# Patient Record
Sex: MALE | Race: WHITE | NOT HISPANIC OR LATINO | Employment: UNEMPLOYED | URBAN - METROPOLITAN AREA
[De-identification: names, ages, dates, MRNs, and addresses within clinical notes are randomized per-mention and may not be internally consistent; named-entity substitution may affect disease eponyms.]

---

## 2019-12-19 LAB
INFLUENZA A ANTIGEN, POC: NEGATIVE
INFLUENZA B ANTIGEN, POC: NEGATIVE
RAPID GROUP A STREP (OHS): NEGATIVE

## 2021-04-19 LAB
CHOLEST SERPL-MCNC: 142 MG/DL (ref 0–200)
EST. AVERAGE GLUCOSE BLD GHB EST-MCNC: 150 MG/DL (ref 70–115)
HBA1C MFR BLD: 7 % (ref 4–6)
HDLC SERPL-MCNC: 32 MG/DL (ref 40–60)
LDLC SERPL CALC-MCNC: 71.1 MG/DL (ref 30–100)
TRIGL SERPL-MCNC: 179 MG/DL (ref 30–200)

## 2021-07-01 LAB
LEFT EYE DM RETINOPATHY: NEGATIVE
RIGHT EYE DM RETINOPATHY: NEGATIVE

## 2021-07-23 LAB
ALBUMIN SERPL-MCNC: 4.4 G/DL (ref 3.4–5)
ALBUMIN/GLOB SERPL: 1.4 {RATIO}
ALP SERPL-CCNC: 90 U/L (ref 50–144)
ALT SERPL-CCNC: 40 U/L (ref 1–45)
ANION GAP SERPL CALC-SCNC: 7 MMOL/L (ref 7–16)
AST SERPL-CCNC: 28 U/L (ref 17–59)
BILIRUB SERPL-MCNC: 0.46 MG/DL (ref 0.1–1)
BUN SERPL-MCNC: 16 MG/DL (ref 7–20)
CALCIUM SERPL-MCNC: 10 MG/DL (ref 8.4–10.2)
CHLORIDE SERPL-SCNC: 102 MMOL/L (ref 94–110)
CO2 SERPL-SCNC: 29 MMOL/L (ref 21–32)
CREAT SERPL-MCNC: 0.9 MG/DL (ref 0.66–1.25)
CREAT/UREA NIT SERPL: 17.8 (ref 12–20)
EST. AVERAGE GLUCOSE BLD GHB EST-MCNC: 147 MG/DL (ref 70–115)
GLOBULIN SER-MCNC: 3.1 G/DL (ref 2–3.9)
GLUCOSE SERPL-MCNC: 152 MGM./DL (ref 70–115)
HBA1C MFR BLD: 6.9 % (ref 4–6)
POTASSIUM SERPL-SCNC: 5 MMOL/L (ref 3.5–5.1)
PROT SERPL-MCNC: 7.5 G/DL (ref 6.3–8.2)
SODIUM SERPL-SCNC: 138 MMOL/L (ref 135–145)

## 2021-09-19 LAB
ALBUMIN SERPL-MCNC: 5.2 G/DL (ref 3.4–5)
ALBUMIN/GLOB SERPL: 1.3 {RATIO}
ALP SERPL-CCNC: 87 U/L (ref 50–144)
ALT SERPL-CCNC: 76 U/L (ref 1–45)
ANION GAP SERPL CALC-SCNC: 10 MMOL/L (ref 7–16)
AST SERPL-CCNC: 49 U/L (ref 17–59)
BILIRUB SERPL-MCNC: 0.69 MG/DL (ref 0.1–1)
BUN SERPL-MCNC: 18 MG/DL (ref 7–20)
CALCIUM SERPL-MCNC: 10.4 MG/DL (ref 8.4–10.2)
CHLORIDE SERPL-SCNC: 103 MMOL/L (ref 94–110)
CO2 SERPL-SCNC: 25 MMOL/L (ref 21–32)
CREAT SERPL-MCNC: 0.96 MG/DL (ref 0.66–1.25)
CREAT/UREA NIT SERPL: 18.8 (ref 12–20)
GLOBULIN SER-MCNC: 4.1 G/DL (ref 2–3.9)
GLUCOSE SERPL-MCNC: 188 MGM./DL (ref 70–115)
POTASSIUM SERPL-SCNC: 5.1 MMOL/L (ref 3.5–5.1)
PROT SERPL-MCNC: 9.3 G/DL (ref 6.3–8.2)
SODIUM SERPL-SCNC: 138 MMOL/L (ref 135–145)

## 2021-09-22 LAB
ALBUMIN SERPL-MCNC: 5 G/DL (ref 3.4–5)
ALBUMIN/GLOB SERPL: 1.5 {RATIO}
ALP SERPL-CCNC: 79 U/L (ref 50–144)
ALT SERPL-CCNC: 44 U/L (ref 1–45)
ANION GAP SERPL CALC-SCNC: 12 MMOL/L (ref 7–16)
AST SERPL-CCNC: 32 U/L (ref 17–59)
BASOPHILS # BLD AUTO: 0.02 X10(3)/MCL (ref 0.01–0.08)
BASOPHILS NFR BLD AUTO: 0.2 % (ref 0.1–1.2)
BILIRUB SERPL-MCNC: 0.53 MG/DL (ref 0.1–1)
BUN SERPL-MCNC: 13 MG/DL (ref 7–20)
CALCIUM SERPL-MCNC: 9.9 MG/DL (ref 8.4–10.2)
CHLORIDE SERPL-SCNC: 98 MMOL/L (ref 94–110)
CO2 SERPL-SCNC: 30 MMOL/L (ref 21–32)
CREAT SERPL-MCNC: 1 MG/DL (ref 0.66–1.25)
CREAT/UREA NIT SERPL: 13 (ref 12–20)
EOSINOPHIL # BLD AUTO: 0.2 X10(3)/MCL (ref 0.04–0.54)
EOSINOPHIL NFR BLD AUTO: 2.1 % (ref 0.7–7)
ERYTHROCYTE [DISTWIDTH] IN BLOOD BY AUTOMATED COUNT: 11.8 % (ref 11.6–14.4)
GLOBULIN SER-MCNC: 3.3 G/DL (ref 2–3.9)
GLUCOSE SERPL-MCNC: 126 MGM./DL (ref 70–115)
HCT VFR BLD AUTO: 45.5 % (ref 36–52)
HGB BLD-MCNC: 15.4 G/DL (ref 13–18)
IMM GRANULOCYTES # BLD AUTO: 0.04 X10E3/UL (ref 0–0.03)
IMM GRANULOCYTES NFR BLD AUTO: 0.4 % (ref 0–0.5)
LYMPHOCYTES # BLD AUTO: 1.89 X10(3)/MCL (ref 1.32–3.57)
LYMPHOCYTES NFR BLD AUTO: 19.8 % (ref 20–55)
MCH RBC QN AUTO: 30 PG (ref 27–34)
MCHC RBC AUTO-ENTMCNC: 33.8 G/DL (ref 31–37)
MCV RBC AUTO: 88.7 FL (ref 79–99)
MONOCYTES # BLD AUTO: 0.49 X10(3)/MCL (ref 0.3–0.82)
MONOCYTES NFR BLD AUTO: 5.1 % (ref 4.7–12.5)
NEUTROPHILS # BLD AUTO: 6.9 X10(3)/MCL (ref 1.78–5.38)
NEUTROPHILS NFR BLD AUTO: 72.4 % (ref 37–73)
PLATELET # BLD AUTO: 314 X10(3)/MCL (ref 140–371)
PMV BLD AUTO: 9.6 FL (ref 9.4–12.4)
POTASSIUM SERPL-SCNC: 4.6 MMOL/L (ref 3.5–5.1)
PROT SERPL-MCNC: 8.3 G/DL (ref 6.3–8.2)
RBC # BLD AUTO: 5.13 X10(6)/MCL (ref 4–6)
RBC MORPH BLD: NORMAL
SODIUM SERPL-SCNC: 140 MMOL/L (ref 135–145)
WBC # SPEC AUTO: 9.5 X10(3)/MCL (ref 4–11.5)

## 2021-10-18 LAB
ALBUMIN SERPL-MCNC: 4.3 G/DL (ref 3.4–5)
ALBUMIN/GLOB SERPL: 1.4 {RATIO}
ALP SERPL-CCNC: 89 U/L (ref 50–144)
ALT SERPL-CCNC: 35 U/L (ref 1–45)
ANION GAP SERPL CALC-SCNC: 10 MMOL/L (ref 7–16)
AST SERPL-CCNC: 28 U/L (ref 17–59)
BASOPHILS # BLD AUTO: 0.04 X10(3)/MCL (ref 0.01–0.08)
BASOPHILS NFR BLD AUTO: 0.3 % (ref 0.1–1.2)
BILIRUB SERPL-MCNC: 0.5 MG/DL (ref 0.1–1)
BUN SERPL-MCNC: 15 MG/DL (ref 7–20)
CALCIUM SERPL-MCNC: 9.9 MG/DL (ref 8.4–10.2)
CHLORIDE SERPL-SCNC: 97 MMOL/L (ref 94–110)
CHOLEST SERPL-MCNC: 202 MG/DL (ref 0–200)
CO2 SERPL-SCNC: 30 MMOL/L (ref 21–32)
CREAT SERPL-MCNC: 0.98 MG/DL (ref 0.66–1.25)
CREAT/UREA NIT SERPL: 15.3 (ref 12–20)
EOSINOPHIL # BLD AUTO: 0.29 X10(3)/MCL (ref 0.04–0.54)
EOSINOPHIL NFR BLD AUTO: 2.3 % (ref 0.7–7)
ERYTHROCYTE [DISTWIDTH] IN BLOOD BY AUTOMATED COUNT: 12.2 % (ref 11.6–14.4)
EST. AVERAGE GLUCOSE BLD GHB EST-MCNC: 159 MG/DL (ref 70–115)
GLOBULIN SER-MCNC: 3.1 G/DL (ref 2–3.9)
GLUCOSE SERPL-MCNC: 183 MGM./DL (ref 70–115)
HBA1C MFR BLD: 7.3 % (ref 4–6)
HCT VFR BLD AUTO: 46.2 % (ref 36–52)
HDLC SERPL-MCNC: 36 MG/DL (ref 40–60)
HGB BLD-MCNC: 15.3 G/DL (ref 13–18)
IMM GRANULOCYTES # BLD AUTO: 0.08 X10E3/UL (ref 0–0.03)
IMM GRANULOCYTES NFR BLD AUTO: 0.6 % (ref 0–0.5)
LDLC SERPL CALC-MCNC: 96.4 MG/DL (ref 30–100)
LYMPHOCYTES # BLD AUTO: 2.38 X10(3)/MCL (ref 1.32–3.57)
LYMPHOCYTES NFR BLD AUTO: 19 % (ref 20–55)
MCH RBC QN AUTO: 29.7 PG (ref 27–34)
MCHC RBC AUTO-ENTMCNC: 33.1 G/DL (ref 31–37)
MCV RBC AUTO: 89.7 FL (ref 79–99)
MONOCYTES # BLD AUTO: 0.75 X10(3)/MCL (ref 0.3–0.82)
MONOCYTES NFR BLD AUTO: 6 % (ref 4.7–12.5)
NEUTROPHILS # BLD AUTO: 8.97 X10(3)/MCL (ref 1.78–5.38)
NEUTROPHILS NFR BLD AUTO: 71.8 % (ref 37–73)
PLATELET # BLD AUTO: 343 X10(3)/MCL (ref 140–371)
PMV BLD AUTO: 9.1 FL (ref 9.4–12.4)
POTASSIUM SERPL-SCNC: 5 MMOL/L (ref 3.5–5.1)
PROT SERPL-MCNC: 7.4 G/DL (ref 6.3–8.2)
RBC # BLD AUTO: 5.15 X10(6)/MCL (ref 4–6)
SODIUM SERPL-SCNC: 137 MMOL/L (ref 135–145)
T4 FREE SERPL-MCNC: 1.07 NG/DL (ref 0.78–2.19)
TRIGL SERPL-MCNC: 298 MG/DL (ref 30–200)
TSH SERPL-ACNC: 1.79 UIU/ML (ref 0.36–3.74)
WBC # SPEC AUTO: 12.5 X10(3)/MCL (ref 4–11.5)

## 2022-01-19 LAB
ALBUMIN SERPL-MCNC: 4.6 G/DL (ref 3.4–5)
ALBUMIN/GLOB SERPL: 1.6 {RATIO}
ALP SERPL-CCNC: 81 U/L (ref 50–144)
ALT SERPL-CCNC: 37 U/L (ref 1–45)
ANION GAP SERPL CALC-SCNC: 9 MMOL/L (ref 2–13)
AST SERPL-CCNC: 38 U/L (ref 17–59)
BASOPHILS # BLD AUTO: 0.03 10*3/UL (ref 0.01–0.08)
BASOPHILS NFR BLD AUTO: 0.3 % (ref 0.1–1.2)
BILIRUB SERPL-MCNC: 0.6 MG/DL (ref 0–1)
BUN SERPL-MCNC: 16 MG/DL (ref 7–20)
CALCIUM SERPL-MCNC: 9.7 MG/DL (ref 8.4–10.2)
CHLORIDE SERPL-SCNC: 98 MMOL/L (ref 94–110)
CO2 SERPL-SCNC: 28 MMOL/L (ref 21–32)
CREAT SERPL-MCNC: 0.83 MG/DL (ref 0.66–1.25)
CREAT/UREA NIT SERPL: 19.3 (ref 12–20)
EOSINOPHIL # BLD AUTO: 0.17 10*3/UL (ref 0.04–0.54)
EOSINOPHIL NFR BLD AUTO: 1.6 % (ref 0.7–7)
ERYTHROCYTE [DISTWIDTH] IN BLOOD BY AUTOMATED COUNT: 12.1 % (ref 11.6–14.4)
EST. AVERAGE GLUCOSE BLD GHB EST-MCNC: 201 MG/DL (ref 70–115)
GLOBULIN SER-MCNC: 2.9 G/DL (ref 2–3.9)
GLUCOSE SERPL-MCNC: 201 MGM./DL (ref 70–115)
HBA1C MFR BLD: 8.7 % (ref 4–6)
HCT VFR BLD AUTO: 45.1 % (ref 36–52)
HGB BLD-MCNC: 15.1 G/DL (ref 13–18)
IMM GRANULOCYTES # BLD AUTO: 0.04 10*3/UL (ref 0–0.03)
IMM GRANULOCYTES NFR BLD AUTO: 0.4 % (ref 0–0.5)
LYMPHOCYTES # BLD AUTO: 2.24 10*3/UL (ref 1.32–3.57)
LYMPHOCYTES NFR BLD AUTO: 21.7 % (ref 20–55)
MCH RBC QN AUTO: 30 PG (ref 27–34)
MCHC RBC AUTO-ENTMCNC: 33.5 G/DL (ref 31–37)
MCV RBC AUTO: 89.5 FL (ref 79–99)
MONOCYTES # BLD AUTO: 0.57 10*3/UL (ref 0.3–0.82)
MONOCYTES NFR BLD AUTO: 5.5 % (ref 4.7–12.5)
NEUTROPHILS # BLD AUTO: 7.27 10*3/UL (ref 1.78–5.38)
NEUTROPHILS NFR BLD AUTO: 70.5 % (ref 37–73)
PLATELET # BLD AUTO: 340 10*3/UL (ref 140–371)
PMV BLD AUTO: 9.3 FL (ref 9.4–12.4)
POTASSIUM SERPL-SCNC: 4.7 MMOL/L (ref 3.5–5.1)
PROT SERPL-MCNC: 7.5 G/DL (ref 6.3–8.2)
RBC # BLD AUTO: 5.04 10*6/UL (ref 4–6)
SODIUM SERPL-SCNC: 135 MMOL/L (ref 135–145)
WBC # SPEC AUTO: 10.3 10*3/UL (ref 4–11.5)

## 2022-01-27 LAB
AGE: 45
ALBUMIN SERPL-MCNC: 4.1 G/DL (ref 3.4–5)
ALBUMIN/GLOB SERPL: 1.6 {RATIO}
ALP SERPL-CCNC: 65 U/L (ref 50–144)
ALT SERPL-CCNC: 36 U/L (ref 1–45)
ANION GAP SERPL CALC-SCNC: 7 MMOL/L (ref 2–13)
AST SERPL-CCNC: 32 U/L (ref 17–59)
BASOPHILS # BLD AUTO: 0.01 10*3/UL (ref 0.01–0.08)
BASOPHILS NFR BLD AUTO: 0.2 % (ref 0.1–1.2)
BILIRUB SERPL-MCNC: 0.53 MG/DL (ref 0–1)
BUN SERPL-MCNC: 14 MG/DL (ref 7–20)
CALCIUM SERPL-MCNC: 9.2 MG/DL (ref 8.4–10.2)
CHLORIDE SERPL-SCNC: 104 MMOL/L (ref 98–110)
CO2 SERPL-SCNC: 29 MMOL/L (ref 21–32)
CREAT SERPL-MCNC: 0.91 MG/DL (ref 0.66–1.25)
CREAT/UREA NIT SERPL: 15.4 (ref 12–20)
EOSINOPHIL # BLD AUTO: 0.07 10*3/UL (ref 0.04–0.54)
EOSINOPHIL NFR BLD AUTO: 1.2 % (ref 0.7–7)
ERYTHROCYTE [DISTWIDTH] IN BLOOD BY AUTOMATED COUNT: 12 % (ref 11.6–14.4)
EST. AVERAGE GLUCOSE BLD GHB EST-MCNC: 168 MG/DL (ref 70–115)
GLOBULIN SER-MCNC: 2.5 G/DL (ref 2–3.9)
GLUCOSE SERPL-MCNC: 135 MG/DL (ref 70–115)
HBA1C MFR BLD: 7.6 % (ref 4–6)
HCT VFR BLD AUTO: 40.2 % (ref 36–52)
HGB BLD-MCNC: 13.7 G/DL (ref 13–18)
IMM GRANULOCYTES # BLD AUTO: 0.02 10*3/UL (ref 0–0.03)
IMM GRANULOCYTES NFR BLD AUTO: 0.4 % (ref 0–0.5)
LYMPHOCYTES # BLD AUTO: 1.74 10*3/UL (ref 1.32–3.57)
LYMPHOCYTES NFR BLD AUTO: 30.6 % (ref 20–55)
MANUAL DIFF? (OHS): NORMAL
MCH RBC QN AUTO: 29.8 PG (ref 27–34)
MCHC RBC AUTO-ENTMCNC: 34.1 G/DL (ref 31–37)
MCV RBC AUTO: 87.6 FL (ref 79–99)
MONOCYTES # BLD AUTO: 0.42 10*3/UL (ref 0.3–0.82)
MONOCYTES NFR BLD AUTO: 7.4 % (ref 4.7–12.5)
NEUTROPHILS # BLD AUTO: 3.43 10*3/UL (ref 1.78–5.38)
NEUTROPHILS NFR BLD AUTO: 60.2 % (ref 37–73)
PLATELET # BLD AUTO: 262 10*3/UL (ref 140–371)
PMV BLD AUTO: 9.2 FL (ref 9.4–12.4)
POTASSIUM SERPL-SCNC: 4.6 MMOL/L (ref 3.5–5.1)
PROT SERPL-MCNC: 6.6 G/DL (ref 6.3–8.2)
RBC # BLD AUTO: 4.59 10*6/UL (ref 4–6)
SODIUM SERPL-SCNC: 140 MMOL/L (ref 135–145)
WBC # SPEC AUTO: 5.7 10*3/UL (ref 4–11.5)

## 2022-04-11 ENCOUNTER — HISTORICAL (OUTPATIENT)
Dept: ADMINISTRATIVE | Facility: HOSPITAL | Age: 47
End: 2022-04-11

## 2022-04-24 VITALS
HEIGHT: 70 IN | OXYGEN SATURATION: 96 % | SYSTOLIC BLOOD PRESSURE: 136 MMHG | DIASTOLIC BLOOD PRESSURE: 84 MMHG | BODY MASS INDEX: 43.84 KG/M2 | WEIGHT: 306.25 LBS

## 2022-05-07 ENCOUNTER — HISTORICAL (OUTPATIENT)
Dept: ADMINISTRATIVE | Facility: HOSPITAL | Age: 47
End: 2022-05-07

## 2022-09-21 ENCOUNTER — HISTORICAL (OUTPATIENT)
Dept: ADMINISTRATIVE | Facility: HOSPITAL | Age: 47
End: 2022-09-21

## 2022-10-18 ENCOUNTER — HISTORICAL (OUTPATIENT)
Dept: ADMINISTRATIVE | Facility: HOSPITAL | Age: 47
End: 2022-10-18

## 2023-01-24 ENCOUNTER — DOCUMENTATION ONLY (OUTPATIENT)
Dept: ADMINISTRATIVE | Facility: HOSPITAL | Age: 48
End: 2023-01-24
Payer: MEDICAID

## 2023-02-17 VITALS
SYSTOLIC BLOOD PRESSURE: 122 MMHG | OXYGEN SATURATION: 99 % | HEART RATE: 83 BPM | DIASTOLIC BLOOD PRESSURE: 82 MMHG | TEMPERATURE: 98 F | BODY MASS INDEX: 41.97 KG/M2 | WEIGHT: 293.19 LBS | HEIGHT: 70 IN

## 2023-02-17 RX ORDER — ROSUVASTATIN CALCIUM 40 MG/1
40 TABLET, COATED ORAL DAILY
COMMUNITY
Start: 2023-01-26 | End: 2023-08-24 | Stop reason: SDUPTHER

## 2023-02-17 RX ORDER — HYDROCHLOROTHIAZIDE 12.5 MG/1
12.5 CAPSULE ORAL DAILY
COMMUNITY
Start: 2023-02-14 | End: 2023-08-21 | Stop reason: SDUPTHER

## 2023-02-17 RX ORDER — LISINOPRIL 40 MG/1
40 TABLET ORAL DAILY
COMMUNITY
Start: 2023-01-25 | End: 2023-03-29 | Stop reason: SDUPTHER

## 2023-02-17 RX ORDER — LANCETS 33 GAUGE
1 EACH MISCELLANEOUS
COMMUNITY

## 2023-02-17 RX ORDER — METFORMIN HYDROCHLORIDE 1000 MG/1
1000 TABLET ORAL 2 TIMES DAILY
COMMUNITY
Start: 2023-02-06 | End: 2023-03-16

## 2023-02-17 RX ORDER — AMITRIPTYLINE HYDROCHLORIDE 25 MG/1
25 TABLET, FILM COATED ORAL NIGHTLY
COMMUNITY

## 2023-02-24 PROCEDURE — 85025 COMPLETE CBC W/AUTO DIFF WBC: CPT | Performed by: NURSE PRACTITIONER

## 2023-02-24 PROCEDURE — 83036 HEMOGLOBIN GLYCOSYLATED A1C: CPT | Performed by: NURSE PRACTITIONER

## 2023-02-24 PROCEDURE — 80053 COMPREHEN METABOLIC PANEL: CPT | Performed by: NURSE PRACTITIONER

## 2023-02-24 PROCEDURE — 80061 LIPID PANEL: CPT | Performed by: NURSE PRACTITIONER

## 2023-02-24 PROCEDURE — 84443 ASSAY THYROID STIM HORMONE: CPT | Performed by: NURSE PRACTITIONER

## 2023-02-28 ENCOUNTER — OFFICE VISIT (OUTPATIENT)
Dept: FAMILY MEDICINE | Facility: CLINIC | Age: 48
End: 2023-02-28
Payer: MEDICAID

## 2023-02-28 VITALS
OXYGEN SATURATION: 99 % | HEIGHT: 70 IN | WEIGHT: 297.63 LBS | DIASTOLIC BLOOD PRESSURE: 88 MMHG | TEMPERATURE: 98 F | BODY MASS INDEX: 42.61 KG/M2 | SYSTOLIC BLOOD PRESSURE: 138 MMHG

## 2023-02-28 DIAGNOSIS — E11.9 TYPE 2 DIABETES MELLITUS WITHOUT COMPLICATION, WITHOUT LONG-TERM CURRENT USE OF INSULIN: ICD-10-CM

## 2023-02-28 DIAGNOSIS — E66.01 MORBID OBESITY: ICD-10-CM

## 2023-02-28 DIAGNOSIS — I25.10 CORONARY ARTERY DISEASE, UNSPECIFIED VESSEL OR LESION TYPE, UNSPECIFIED WHETHER ANGINA PRESENT, UNSPECIFIED WHETHER NATIVE OR TRANSPLANTED HEART: ICD-10-CM

## 2023-02-28 DIAGNOSIS — I87.2 CHRONIC VENOUS INSUFFICIENCY: ICD-10-CM

## 2023-02-28 DIAGNOSIS — I10 PRIMARY HYPERTENSION: ICD-10-CM

## 2023-02-28 DIAGNOSIS — E78.2 MIXED HYPERLIPIDEMIA: ICD-10-CM

## 2023-02-28 DIAGNOSIS — Z00.01 ENCOUNTER FOR ROUTINE ADULT HEALTH EXAMINATION WITH ABNORMAL FINDINGS: Primary | ICD-10-CM

## 2023-02-28 PROCEDURE — 4010F PR ACE/ARB THEARPY RXD/TAKEN: ICD-10-PCS | Mod: CPTII,,, | Performed by: NURSE PRACTITIONER

## 2023-02-28 PROCEDURE — 3008F BODY MASS INDEX DOCD: CPT | Mod: CPTII,,, | Performed by: NURSE PRACTITIONER

## 2023-02-28 PROCEDURE — 1160F PR REVIEW ALL MEDS BY PRESCRIBER/CLIN PHARMACIST DOCUMENTED: ICD-10-PCS | Mod: CPTII,,, | Performed by: NURSE PRACTITIONER

## 2023-02-28 PROCEDURE — 1159F MED LIST DOCD IN RCRD: CPT | Mod: CPTII,,, | Performed by: NURSE PRACTITIONER

## 2023-02-28 PROCEDURE — 99396 PR PREVENTIVE VISIT,EST,40-64: ICD-10-PCS | Mod: ,,, | Performed by: NURSE PRACTITIONER

## 2023-02-28 PROCEDURE — 3008F PR BODY MASS INDEX (BMI) DOCUMENTED: ICD-10-PCS | Mod: CPTII,,, | Performed by: NURSE PRACTITIONER

## 2023-02-28 PROCEDURE — 1160F RVW MEDS BY RX/DR IN RCRD: CPT | Mod: CPTII,,, | Performed by: NURSE PRACTITIONER

## 2023-02-28 PROCEDURE — 3079F PR MOST RECENT DIASTOLIC BLOOD PRESSURE 80-89 MM HG: ICD-10-PCS | Mod: CPTII,,, | Performed by: NURSE PRACTITIONER

## 2023-02-28 PROCEDURE — 99396 PREV VISIT EST AGE 40-64: CPT | Mod: ,,, | Performed by: NURSE PRACTITIONER

## 2023-02-28 PROCEDURE — 3075F PR MOST RECENT SYSTOLIC BLOOD PRESS GE 130-139MM HG: ICD-10-PCS | Mod: CPTII,,, | Performed by: NURSE PRACTITIONER

## 2023-02-28 PROCEDURE — 3075F SYST BP GE 130 - 139MM HG: CPT | Mod: CPTII,,, | Performed by: NURSE PRACTITIONER

## 2023-02-28 PROCEDURE — 4010F ACE/ARB THERAPY RXD/TAKEN: CPT | Mod: CPTII,,, | Performed by: NURSE PRACTITIONER

## 2023-02-28 PROCEDURE — 1159F PR MEDICATION LIST DOCUMENTED IN MEDICAL RECORD: ICD-10-PCS | Mod: CPTII,,, | Performed by: NURSE PRACTITIONER

## 2023-02-28 PROCEDURE — 3079F DIAST BP 80-89 MM HG: CPT | Mod: CPTII,,, | Performed by: NURSE PRACTITIONER

## 2023-02-28 RX ORDER — LINAGLIPTIN 5 MG/1
5 TABLET, FILM COATED ORAL DAILY
Qty: 90 TABLET | Refills: 3 | Status: SHIPPED | OUTPATIENT
Start: 2023-02-28 | End: 2023-05-23 | Stop reason: SINTOL

## 2023-02-28 NOTE — PROGRESS NOTES
Patient ID: [id number]     Chief Complaint: Annual Exam        HPI:     Bladimir Real is a 47 y.o. male here today for an annual wellness. Reviewed and discussed lab results. He's gained 4 lbs in the last 4 months.  Overall he feels well. No other complaints today. Previous A1C 7.6 on 10/10/22.  He reports having had his diabetic eye exam in this office at his previous visit.  He admits to not following his diabetic diet in his reintroduced sodas.  He checks his blood sugar every other day and notes that it has been high.  He knew that his hemoglobin A1c would increase at today's visit.  Does not exercise.  He still has his Cologuard test at home and forgot to do it    Past Medical History:  has a past medical history of Diabetes mellitus, type 2, Hyperlipidemia, Hypertension, and Morbid obesity.    Surgical History:  has a past surgical history that includes lumpectomy left upper arm.    Family History: family history includes Hypertension in his mother.    Social History:  reports that he has never smoked. His smokeless tobacco use includes chew. He reports that he does not drink alcohol and does not use drugs.    Current Outpatient Medications   Medication Instructions    amitriptyline (ELAVIL) 25 mg, Oral, Nightly    blood sugar diagnostic Strp 1 strip, Misc.(Non-Drug; Combo Route), 2 times daily    blood-glucose meter (BLOOD GLUCOSE MONITOR KIT MISC) 1 Units, Misc.(Non-Drug; Combo Route)    hydroCHLOROthiazide (MICROZIDE) 12.5 mg, Oral, Daily    lancets 33 gauge Misc 1 lancet, Misc.(Non-Drug; Combo Route)    lisinopriL (PRINIVIL,ZESTRIL) 40 mg, Oral, Daily    metFORMIN (GLUCOPHAGE) 1,000 mg, Oral, 2 times daily    rosuvastatin (CRESTOR) 40 mg, Oral, Daily    TRADJENTA 5 mg, Oral, Daily       Patient is allergic to penicillins.     Patient Care Team:  ARLYN Morales as PCP - General (Family Medicine)  Grupo Moura MD as Consulting Physician (Cardiology)     Subjective:     Review of Systems    12  "point review of systems conducted, negative except as stated in the history of present illness. See HPI for details.      Objective:     Visit Vitals  /88 (BP Location: Right arm)   Temp 98.1 °F (36.7 °C) (Temporal)   Ht 5' 9.69" (1.77 m)   Wt 135 kg (297 lb 9.9 oz)   SpO2 99%   BMI 43.08 kg/m²       Physical Exam    General: Alert and oriented, No acute distress.  Head: Normocephalic, Atraumatic.  Eye: Pupils are equal, round and reactive to light, Extraocular movements are intact, Sclera non-icteric.  Ears/Nose/Throat: Normal, Mucosa moist,Clear.  Neck/Thyroid: Supple, Non-tender, No carotid bruit, No palpable thyromegaly or thyroid nodule, No lymphadenopathy, No JVD, Full range of motion.  Respiratory: Clear to auscultation bilaterally; No wheezes, rales or rhonchi,Non-labored respirations, Symmetrical chest wall expansion.  Cardiovascular: Regular rate and rhythm, S1/S2 normal, No murmurs, rubs or gallops.  Gastrointestinal: Soft, Non-tender, Non-distended, Normal bowel sounds, No palpable organomegaly.  Musculoskeletal: Normal range of motion.  Integumentary: Warm, Dry, Intact, No suspicious lesions or rashes.  Extremities: No clubbing, cyanosis or edema  Neurologic: No focal deficits, Cranial Nerves II-XII are grossly intact, Motor strength normal upper and lower extremities, Sensory exam intact.  Psychiatric: Normal interaction, Coherent speech, Euthymic mood, Appropriate affect       Labs Reviewed:     Chemistry:  Lab Results   Component Value Date     02/24/2023    K 4.8 02/24/2023    CHLORIDE 98 02/24/2023    BUN 14.0 02/24/2023    CREATININE 0.85 02/24/2023    EGFRNORACEVR >90 02/24/2023    GLUCOSE 244 (H) 02/24/2023    CALCIUM 9.8 02/24/2023    ALKPHOS 104 02/24/2023    LABPROT 7.2 02/24/2023    ALBUMIN 4.4 02/24/2023    AST 37 02/24/2023    ALT 39 02/24/2023    TSH 1.670 02/24/2023    [id number] 1.07 10/18/2021        Lab Results   Component Value Date    HGBA1C 10.0 (H) 02/24/2023    "     Hematology:  Lab Results   Component Value Date    WBC 9.3 02/24/2023    RBC 5.22 02/24/2023    HGB 15.6 02/24/2023    HCT 45.1 02/24/2023    MCV 86.4 02/24/2023    MCH 29.9 02/24/2023    MCHC 34.6 02/24/2023    RDW 11.8 02/24/2023     02/24/2023    MPV 8.9 (L) 02/24/2023       Lipid Panel:  Lab Results   Component Value Date    CHOL 195 02/24/2023    HDL 34 (L) 02/24/2023    DLDL 80.2 02/24/2023    TRIG 412 (H) 02/24/2023          Assessment:       ICD-10-CM ICD-9-CM   1. Encounter for routine adult health examination with abnormal findings  Z00.01 V70.0   2. Type 2 diabetes mellitus without complication, without long-term current use of insulin  E11.9 250.00   3. Primary hypertension  I10 401.9   4. Mixed hyperlipidemia  E78.2 272.2   5. Morbid obesity  E66.01 278.01   6. Chronic venous insufficiency  I87.2 459.81   7. Coronary artery disease, unspecified vessel or lesion type, unspecified whether angina present, unspecified whether native or transplanted heart  I25.10 414.00        Plan:     He is aware of his numerous risk factors for long term complications of uncontrolled diabetes and cardiovascular disease and despite this is not willing to start insulin at this time.  He has not tolerated GLP 1 or SGLT2 class of medication.  He is agreeable to Tradjenta and we will resume his diabetic diet.  He will check his blood sugar everyday and notify the office if there have been no significant decreases over the next 30 days.  We will recheck his labs in 3 months and he will RTC.    Colon Cancer Screening -  he is agreeable and will complete his Cologuard that is at his house.  Eye Exam - Recommend annually. Last Eye Exam 7/2022  Dental Exam - Recommend biannual exams.     Follow up in about 3 months (around 5/28/2023) for Follow Up, Labs Prior, Wellness in 1 year with labs prior. In addition to their scheduled follow up, the patient has also been instructed to follow up on as needed basis.     Future  Appointments   Date Time Provider Department [place]   5/17/2023  8:40 AM LAB, [facility] LABORATORY DRAW STATION [facility] TAMAR Hernandez [facility]   5/23/2023  7:30 [facility]   2/22/2024  8:10 AM LAB, [facility] LABORATORY DRAW STATION [facility] TAMAR Hernandez [facility]   2/29/2024  9:30 AM ARLYN Morales LARA Hernandez [facility]        ARLYN Soni

## 2023-03-29 ENCOUNTER — TELEPHONE (OUTPATIENT)
Dept: FAMILY MEDICINE | Facility: CLINIC | Age: 48
End: 2023-03-29
Payer: MEDICAID

## 2023-03-29 DIAGNOSIS — I10 HYPERTENSION, UNSPECIFIED TYPE: Primary | ICD-10-CM

## 2023-03-29 RX ORDER — LISINOPRIL 40 MG/1
40 TABLET ORAL DAILY
Qty: 30 TABLET | Refills: 0 | Status: SHIPPED | OUTPATIENT
Start: 2023-03-29 | End: 2023-04-26 | Stop reason: SDUPTHER

## 2023-03-29 NOTE — TELEPHONE ENCOUNTER
sent    ----- Message from Bettina Marino sent at 3/29/2023  8:26 AM CDT -----  Regarding: Refill  lisinopriL (PRINIVIL,ZESTRIL) 40 MG tablet      [facility] [phone]

## 2023-04-19 ENCOUNTER — TELEPHONE (OUTPATIENT)
Dept: FAMILY MEDICINE | Facility: CLINIC | Age: 48
End: 2023-04-19
Payer: MEDICAID

## 2023-04-19 DIAGNOSIS — E11.9 TYPE 2 DIABETES MELLITUS WITHOUT COMPLICATION, WITHOUT LONG-TERM CURRENT USE OF INSULIN: ICD-10-CM

## 2023-04-19 RX ORDER — METFORMIN HYDROCHLORIDE 1000 MG/1
1000 TABLET ORAL 2 TIMES DAILY
Qty: 60 TABLET | Refills: 1 | Status: SHIPPED | OUTPATIENT
Start: 2023-04-19 | End: 2023-07-25 | Stop reason: SDUPTHER

## 2023-04-19 NOTE — TELEPHONE ENCOUNTER
----- Message from Suresh Owens sent at 4/19/2023  8:59 AM CDT -----  Regarding: refills  Metformin    [facility]    [phone]

## 2023-04-26 DIAGNOSIS — I10 HYPERTENSION, UNSPECIFIED TYPE: ICD-10-CM

## 2023-04-26 RX ORDER — LISINOPRIL 40 MG/1
40 TABLET ORAL DAILY
Qty: 30 TABLET | Refills: 0 | Status: SHIPPED | OUTPATIENT
Start: 2023-04-26 | End: 2023-05-25 | Stop reason: SDUPTHER

## 2023-05-17 PROCEDURE — 80053 COMPREHEN METABOLIC PANEL: CPT | Performed by: NURSE PRACTITIONER

## 2023-05-17 PROCEDURE — 83036 HEMOGLOBIN GLYCOSYLATED A1C: CPT | Performed by: NURSE PRACTITIONER

## 2023-05-23 ENCOUNTER — OFFICE VISIT (OUTPATIENT)
Dept: FAMILY MEDICINE | Facility: CLINIC | Age: 48
End: 2023-05-23
Payer: MEDICAID

## 2023-05-23 VITALS
DIASTOLIC BLOOD PRESSURE: 82 MMHG | OXYGEN SATURATION: 99 % | WEIGHT: 289.19 LBS | BODY MASS INDEX: 41.4 KG/M2 | HEIGHT: 70 IN | HEART RATE: 78 BPM | TEMPERATURE: 98 F | SYSTOLIC BLOOD PRESSURE: 122 MMHG

## 2023-05-23 DIAGNOSIS — R05.9 COUGH, UNSPECIFIED TYPE: ICD-10-CM

## 2023-05-23 DIAGNOSIS — I87.2 CHRONIC VENOUS INSUFFICIENCY: ICD-10-CM

## 2023-05-23 DIAGNOSIS — E11.9 TYPE 2 DIABETES MELLITUS WITHOUT COMPLICATION, WITHOUT LONG-TERM CURRENT USE OF INSULIN: Primary | ICD-10-CM

## 2023-05-23 DIAGNOSIS — R06.2 WHEEZING: ICD-10-CM

## 2023-05-23 DIAGNOSIS — I25.10 CORONARY ARTERY DISEASE, UNSPECIFIED VESSEL OR LESION TYPE, UNSPECIFIED WHETHER ANGINA PRESENT, UNSPECIFIED WHETHER NATIVE OR TRANSPLANTED HEART: ICD-10-CM

## 2023-05-23 DIAGNOSIS — I10 PRIMARY HYPERTENSION: ICD-10-CM

## 2023-05-23 DIAGNOSIS — J06.9 UPPER RESPIRATORY TRACT INFECTION, UNSPECIFIED TYPE: ICD-10-CM

## 2023-05-23 DIAGNOSIS — E78.2 MIXED HYPERLIPIDEMIA: ICD-10-CM

## 2023-05-23 DIAGNOSIS — E66.01 MORBID OBESITY: ICD-10-CM

## 2023-05-23 LAB — POTASSIUM SERPL-SCNC: 5.3 MMOL/L (ref 3.5–5.1)

## 2023-05-23 PROCEDURE — 1159F MED LIST DOCD IN RCRD: CPT | Mod: CPTII,,, | Performed by: NURSE PRACTITIONER

## 2023-05-23 PROCEDURE — 84132 ASSAY OF SERUM POTASSIUM: CPT | Performed by: NURSE PRACTITIONER

## 2023-05-23 PROCEDURE — 3079F PR MOST RECENT DIASTOLIC BLOOD PRESSURE 80-89 MM HG: ICD-10-PCS | Mod: CPTII,,, | Performed by: NURSE PRACTITIONER

## 2023-05-23 PROCEDURE — 4010F ACE/ARB THERAPY RXD/TAKEN: CPT | Mod: CPTII,,, | Performed by: NURSE PRACTITIONER

## 2023-05-23 PROCEDURE — 4010F PR ACE/ARB THEARPY RXD/TAKEN: ICD-10-PCS | Mod: CPTII,,, | Performed by: NURSE PRACTITIONER

## 2023-05-23 PROCEDURE — 3008F BODY MASS INDEX DOCD: CPT | Mod: CPTII,,, | Performed by: NURSE PRACTITIONER

## 2023-05-23 PROCEDURE — 1160F RVW MEDS BY RX/DR IN RCRD: CPT | Mod: CPTII,,, | Performed by: NURSE PRACTITIONER

## 2023-05-23 PROCEDURE — 1160F PR REVIEW ALL MEDS BY PRESCRIBER/CLIN PHARMACIST DOCUMENTED: ICD-10-PCS | Mod: CPTII,,, | Performed by: NURSE PRACTITIONER

## 2023-05-23 PROCEDURE — 3074F PR MOST RECENT SYSTOLIC BLOOD PRESSURE < 130 MM HG: ICD-10-PCS | Mod: CPTII,,, | Performed by: NURSE PRACTITIONER

## 2023-05-23 PROCEDURE — 1159F PR MEDICATION LIST DOCUMENTED IN MEDICAL RECORD: ICD-10-PCS | Mod: CPTII,,, | Performed by: NURSE PRACTITIONER

## 2023-05-23 PROCEDURE — 3079F DIAST BP 80-89 MM HG: CPT | Mod: CPTII,,, | Performed by: NURSE PRACTITIONER

## 2023-05-23 PROCEDURE — 3074F SYST BP LT 130 MM HG: CPT | Mod: CPTII,,, | Performed by: NURSE PRACTITIONER

## 2023-05-23 PROCEDURE — 99214 PR OFFICE/OUTPT VISIT, EST, LEVL IV, 30-39 MIN: ICD-10-PCS | Mod: ,,, | Performed by: NURSE PRACTITIONER

## 2023-05-23 PROCEDURE — 3008F PR BODY MASS INDEX (BMI) DOCUMENTED: ICD-10-PCS | Mod: CPTII,,, | Performed by: NURSE PRACTITIONER

## 2023-05-23 PROCEDURE — 99214 OFFICE O/P EST MOD 30 MIN: CPT | Mod: ,,, | Performed by: NURSE PRACTITIONER

## 2023-05-23 RX ORDER — DAPAGLIFLOZIN 10 MG/1
10 TABLET, FILM COATED ORAL DAILY
Qty: 90 TABLET | Refills: 0 | Status: SHIPPED | OUTPATIENT
Start: 2023-05-23 | End: 2023-08-21 | Stop reason: SDUPTHER

## 2023-05-23 RX ORDER — ALBUTEROL SULFATE 90 UG/1
2 AEROSOL, METERED RESPIRATORY (INHALATION) EVERY 6 HOURS PRN
Qty: 8 G | Refills: 0 | Status: SHIPPED | OUTPATIENT
Start: 2023-05-23 | End: 2023-06-22

## 2023-05-23 NOTE — PROGRESS NOTES
Patient ID: [id number]     Chief Complaint: Hypertension, Hyperlipidemia, and Diabetes      HPI:     Bladimir Real is a 47 y.o. male here today for a 3 month routine visit for his uncontrolled diabetes and high cholesterol.  His labs are reviewed with him today.  And see down to 8.7 from 10.0.  Potassium 5.3  Took tradjenta and he felt nauseated for the first several days.  Stopped after a  few days and nausea resolved.  Still taking metformin twice a day.   He started coughing 2 days ago and feels a burning in his chest.  He has been taking over-the-counter medications.  He denies fever or chills.  No body aches.    Past Medical History:  has a past medical history of Diabetes mellitus, type 2, Hyperlipidemia, Hypertension, and Morbid obesity.    Surgical History:  has a past surgical history that includes lumpectomy left upper arm.    Family History: family history includes Hypertension in his mother.    Social History:  reports that he has never smoked. His smokeless tobacco use includes chew. He reports that he does not drink alcohol and does not use drugs.    Current Outpatient Medications   Medication Instructions    albuterol (PROAIR HFA) 90 mcg/actuation inhaler 2 puffs, Inhalation, Every 6 hours PRN, Rescue    amitriptyline (ELAVIL) 25 mg, Oral, Nightly    blood sugar diagnostic Strp 1 strip, Misc.(Non-Drug; Combo Route), 2 times daily    blood-glucose meter (BLOOD GLUCOSE MONITOR KIT MISC) 1 Units, Misc.(Non-Drug; Combo Route)    FARXIGA 10 mg, Oral, Daily    hydroCHLOROthiazide (MICROZIDE) 12.5 mg, Oral, Daily    lancets 33 gauge Misc 1 lancet, Misc.(Non-Drug; Combo Route)    lisinopriL (PRINIVIL,ZESTRIL) 40 mg, Oral, Daily    metFORMIN (GLUCOPHAGE) 1,000 mg, Oral, 2 times daily    rosuvastatin (CRESTOR) 40 mg, Oral, Daily       Patient is allergic to penicillins.     Patient Care Team:  ARLYN Morales as PCP - General (Family Medicine)  Grupo Moura MD as Consulting Physician  "(Cardiology)  Ritchie Bar MD as Consulting Physician (Cardiology)       Subjective:     Review of Systems    See HPI     Objective:     Visit Vitals  /82 (BP Location: Left arm)   Pulse 78   Temp 97.9 °F (36.6 °C) (Temporal)   Ht 5' 9.69" (1.77 m)   Wt 131.2 kg (289 lb 3.2 oz)   SpO2 99%   BMI 41.87 kg/m²       Physical Exam  Vitals reviewed.   Constitutional:       General: He is not in acute distress.     Appearance: Normal appearance. He is obese. He is not ill-appearing.   Cardiovascular:      Rate and Rhythm: Normal rate and regular rhythm.      Heart sounds: Normal heart sounds.   Pulmonary:      Effort: Pulmonary effort is normal.      Breath sounds: Wheezing present.   Musculoskeletal:      Right lower leg: No edema.      Left lower leg: No edema.   Skin:     General: Skin is warm and dry.   Neurological:      Mental Status: He is alert and oriented to person, place, and time.   Psychiatric:         Mood and Affect: Mood normal.         Labs Reviewed:     Chemistry:  Lab Results   Component Value Date     05/17/2023    K 5.3 (H) 05/17/2023    CHLORIDE 99 05/17/2023    BUN 14.0 05/17/2023    CREATININE 0.96 05/17/2023    EGFRNORACEVR >90 05/17/2023    GLUCOSE 174 (H) 05/17/2023    CALCIUM 9.7 05/17/2023    ALKPHOS 83 05/17/2023    LABPROT 7.2 05/17/2023    ALBUMIN 4.3 05/17/2023    AST 24 05/17/2023    ALT 24 05/17/2023    TSH 1.670 02/24/2023    [id number] 1.07 10/18/2021        Lab Results   Component Value Date    HGBA1C 8.7 (H) 05/17/2023        Hematology:  Lab Results   Component Value Date    WBC 9.3 02/24/2023    RBC 5.22 02/24/2023    HGB 15.6 02/24/2023    HCT 45.1 02/24/2023    MCV 86.4 02/24/2023    MCH 29.9 02/24/2023    MCHC 34.6 02/24/2023    RDW 11.8 02/24/2023     02/24/2023    MPV 8.9 (L) 02/24/2023       Lipid Panel:  Lab Results   Component Value Date    CHOL 195 02/24/2023    HDL 34 (L) 02/24/2023    DLDL 80.2 02/24/2023    TRIG 412 (H) 02/24/2023    "         Assessment:       ICD-10-CM ICD-9-CM   1. Type 2 diabetes mellitus without complication, without long-term current use of insulin  E11.9 250.00   2. Primary hypertension  I10 401.9   3. Morbid obesity  E66.01 278.01   4. Mixed hyperlipidemia  E78.2 272.2   5. Chronic venous insufficiency  I87.2 459.81   6. Coronary artery disease, unspecified vessel or lesion type, unspecified whether angina present, unspecified whether native or transplanted heart  I25.10 414.00   7. Upper respiratory tract infection, unspecified type  J06.9 465.9   8. Cough, unspecified type  R05.9 786.2   9. Wheezing  R06.2 786.07        Plan:     Start farxiga  Stop tradjenta  Continue with strict diabetic diet   Encouraged exercise   Recheck potassium today  Labs in 3 months, we will check beta cell function and RTC for results.  He started coughing 2 days ago and has been taking over-the-counter medications with minimal improvement.  He was encouraged to continue treating symptoms over-the-counter and notify the office if no improvement in 7-10 days for an antibiotic to be called out.    Follow up in about 3 months (around 8/23/2023) for DM Follow Up, Labs Prior. In addition to their scheduled follow up, the patient has also been instructed to follow up on as needed basis.     Future Appointments   Date Time Provider Department Center   8/11/2023  8:50 AM LAB, [facility] LABORATORY DRAW STATION [facility] TAMAR Hernandez [facility]   8/24/2023  7:30 AM ARLYN Morales [facility]   2/22/2024  8:10 AM LAB, [facility] LABORATORY DRAW STATION JAYLA TAMAR Hernandez [facility]   2/29/2024  9:30 AM ARLYN Morales LARA Hernandez [facility]        ARLYN Soni

## 2023-05-24 DIAGNOSIS — E11.9 TYPE 2 DIABETES MELLITUS WITHOUT COMPLICATION, WITHOUT LONG-TERM CURRENT USE OF INSULIN: Primary | ICD-10-CM

## 2023-05-24 NOTE — PROGRESS NOTES
Labs reviewed. Potassium unchanged. Repeat Potassium level in 2 weeks after starting Farxiga.  Will call with any changes to medications.  Keep follow up as scheduled.

## 2023-05-25 ENCOUNTER — TELEPHONE (OUTPATIENT)
Dept: FAMILY MEDICINE | Facility: CLINIC | Age: 48
End: 2023-05-25
Payer: MEDICAID

## 2023-05-25 DIAGNOSIS — I10 HYPERTENSION, UNSPECIFIED TYPE: ICD-10-CM

## 2023-05-25 RX ORDER — LISINOPRIL 40 MG/1
40 TABLET ORAL DAILY
Qty: 30 TABLET | Refills: 0 | Status: SHIPPED | OUTPATIENT
Start: 2023-05-25 | End: 2023-06-27 | Stop reason: SDUPTHER

## 2023-06-27 ENCOUNTER — TELEPHONE (OUTPATIENT)
Dept: FAMILY MEDICINE | Facility: CLINIC | Age: 48
End: 2023-06-27
Payer: MEDICAID

## 2023-06-27 DIAGNOSIS — I10 HYPERTENSION, UNSPECIFIED TYPE: ICD-10-CM

## 2023-06-27 RX ORDER — LISINOPRIL 40 MG/1
40 TABLET ORAL DAILY
Qty: 30 TABLET | Refills: 0 | Status: SHIPPED | OUTPATIENT
Start: 2023-06-27 | End: 2023-07-25 | Stop reason: SDUPTHER

## 2023-07-25 ENCOUNTER — TELEPHONE (OUTPATIENT)
Dept: FAMILY MEDICINE | Facility: CLINIC | Age: 48
End: 2023-07-25
Payer: MEDICAID

## 2023-07-25 DIAGNOSIS — E11.9 TYPE 2 DIABETES MELLITUS WITHOUT COMPLICATION, WITHOUT LONG-TERM CURRENT USE OF INSULIN: ICD-10-CM

## 2023-07-25 DIAGNOSIS — I10 HYPERTENSION, UNSPECIFIED TYPE: ICD-10-CM

## 2023-07-25 RX ORDER — METFORMIN HYDROCHLORIDE 1000 MG/1
1000 TABLET ORAL 2 TIMES DAILY
Qty: 60 TABLET | Refills: 1 | Status: SHIPPED | OUTPATIENT
Start: 2023-07-25 | End: 2023-08-24 | Stop reason: SDUPTHER

## 2023-07-25 RX ORDER — LISINOPRIL 40 MG/1
40 TABLET ORAL DAILY
Qty: 30 TABLET | Refills: 0 | Status: SHIPPED | OUTPATIENT
Start: 2023-07-25 | End: 2023-08-24 | Stop reason: SDUPTHER

## 2023-07-27 ENCOUNTER — TELEPHONE (OUTPATIENT)
Dept: FAMILY MEDICINE | Facility: CLINIC | Age: 48
End: 2023-07-27
Payer: MEDICAID

## 2023-07-27 DIAGNOSIS — Z12.11 SCREENING FOR COLON CANCER: Primary | ICD-10-CM

## 2023-07-27 NOTE — TELEPHONE ENCOUNTER
----- Message from Aurea Orona MA sent at 7/24/2023  1:50 PM CDT -----  Regarding: Colon Cancer Screening Due  Our records indicate the patient is due for a colorectal cancer screening. Please find out if the patient has had a colonoscopy in the last 10 years, Cologuard in the last 3 years or any other colon cancer screening so we can obtain those records.  If not, please get it set up for them.

## 2023-08-11 PROCEDURE — 80061 LIPID PANEL: CPT | Performed by: NURSE PRACTITIONER

## 2023-08-11 PROCEDURE — 83525 ASSAY OF INSULIN: CPT | Performed by: NURSE PRACTITIONER

## 2023-08-11 PROCEDURE — 86341 ISLET CELL ANTIBODY: CPT | Performed by: NURSE PRACTITIONER

## 2023-08-11 PROCEDURE — 83036 HEMOGLOBIN GLYCOSYLATED A1C: CPT | Performed by: NURSE PRACTITIONER

## 2023-08-11 PROCEDURE — 84681 ASSAY OF C-PEPTIDE: CPT | Performed by: NURSE PRACTITIONER

## 2023-08-11 PROCEDURE — 80053 COMPREHEN METABOLIC PANEL: CPT | Performed by: NURSE PRACTITIONER

## 2023-08-21 ENCOUNTER — TELEPHONE (OUTPATIENT)
Dept: FAMILY MEDICINE | Facility: CLINIC | Age: 48
End: 2023-08-21
Payer: MEDICAID

## 2023-08-21 DIAGNOSIS — E11.9 TYPE 2 DIABETES MELLITUS WITHOUT COMPLICATION, WITHOUT LONG-TERM CURRENT USE OF INSULIN: Primary | ICD-10-CM

## 2023-08-21 DIAGNOSIS — I10 PRIMARY HYPERTENSION: ICD-10-CM

## 2023-08-21 RX ORDER — HYDROCHLOROTHIAZIDE 12.5 MG/1
12.5 CAPSULE ORAL DAILY
Qty: 30 CAPSULE | Refills: 3 | Status: SHIPPED | OUTPATIENT
Start: 2023-08-21 | End: 2023-08-24 | Stop reason: SDUPTHER

## 2023-08-21 RX ORDER — DAPAGLIFLOZIN 10 MG/1
10 TABLET, FILM COATED ORAL DAILY
Qty: 90 TABLET | Refills: 0 | Status: SHIPPED | OUTPATIENT
Start: 2023-08-21 | End: 2023-08-24 | Stop reason: SDUPTHER

## 2023-08-24 ENCOUNTER — TELEPHONE (OUTPATIENT)
Dept: FAMILY MEDICINE | Facility: CLINIC | Age: 48
End: 2023-08-24

## 2023-08-24 ENCOUNTER — OFFICE VISIT (OUTPATIENT)
Dept: FAMILY MEDICINE | Facility: CLINIC | Age: 48
End: 2023-08-24
Payer: MEDICAID

## 2023-08-24 VITALS
HEIGHT: 70 IN | BODY MASS INDEX: 39.58 KG/M2 | HEART RATE: 72 BPM | SYSTOLIC BLOOD PRESSURE: 120 MMHG | DIASTOLIC BLOOD PRESSURE: 82 MMHG | TEMPERATURE: 98 F | WEIGHT: 276.44 LBS | OXYGEN SATURATION: 99 %

## 2023-08-24 DIAGNOSIS — I10 HYPERTENSION, UNSPECIFIED TYPE: ICD-10-CM

## 2023-08-24 DIAGNOSIS — E78.2 MIXED HYPERLIPIDEMIA: ICD-10-CM

## 2023-08-24 DIAGNOSIS — I10 PRIMARY HYPERTENSION: ICD-10-CM

## 2023-08-24 DIAGNOSIS — Z12.11 SCREENING FOR MALIGNANT NEOPLASM OF COLON: ICD-10-CM

## 2023-08-24 DIAGNOSIS — E11.9 TYPE 2 DIABETES MELLITUS WITHOUT COMPLICATION, WITHOUT LONG-TERM CURRENT USE OF INSULIN: Primary | ICD-10-CM

## 2023-08-24 DIAGNOSIS — E66.01 MORBID OBESITY: ICD-10-CM

## 2023-08-24 DIAGNOSIS — E78.2 MIXED HYPERLIPIDEMIA: Primary | ICD-10-CM

## 2023-08-24 DIAGNOSIS — I87.2 CHRONIC VENOUS INSUFFICIENCY: ICD-10-CM

## 2023-08-24 DIAGNOSIS — I25.10 CORONARY ARTERY DISEASE, UNSPECIFIED VESSEL OR LESION TYPE, UNSPECIFIED WHETHER ANGINA PRESENT, UNSPECIFIED WHETHER NATIVE OR TRANSPLANTED HEART: ICD-10-CM

## 2023-08-24 DIAGNOSIS — E11.9 TYPE 2 DIABETES MELLITUS WITHOUT COMPLICATION, WITHOUT LONG-TERM CURRENT USE OF INSULIN: ICD-10-CM

## 2023-08-24 PROCEDURE — 1160F PR REVIEW ALL MEDS BY PRESCRIBER/CLIN PHARMACIST DOCUMENTED: ICD-10-PCS | Mod: CPTII,,, | Performed by: NURSE PRACTITIONER

## 2023-08-24 PROCEDURE — 99213 OFFICE O/P EST LOW 20 MIN: CPT | Mod: ,,, | Performed by: NURSE PRACTITIONER

## 2023-08-24 PROCEDURE — 3051F PR MOST RECENT HEMOGLOBIN A1C LEVEL 7.0 - < 8.0%: ICD-10-PCS | Mod: CPTII,,, | Performed by: NURSE PRACTITIONER

## 2023-08-24 PROCEDURE — 3079F PR MOST RECENT DIASTOLIC BLOOD PRESSURE 80-89 MM HG: ICD-10-PCS | Mod: CPTII,,, | Performed by: NURSE PRACTITIONER

## 2023-08-24 PROCEDURE — 4010F ACE/ARB THERAPY RXD/TAKEN: CPT | Mod: CPTII,,, | Performed by: NURSE PRACTITIONER

## 2023-08-24 PROCEDURE — 3008F BODY MASS INDEX DOCD: CPT | Mod: CPTII,,, | Performed by: NURSE PRACTITIONER

## 2023-08-24 PROCEDURE — 3074F SYST BP LT 130 MM HG: CPT | Mod: CPTII,,, | Performed by: NURSE PRACTITIONER

## 2023-08-24 PROCEDURE — 3079F DIAST BP 80-89 MM HG: CPT | Mod: CPTII,,, | Performed by: NURSE PRACTITIONER

## 2023-08-24 PROCEDURE — 3051F HG A1C>EQUAL 7.0%<8.0%: CPT | Mod: CPTII,,, | Performed by: NURSE PRACTITIONER

## 2023-08-24 PROCEDURE — 3074F PR MOST RECENT SYSTOLIC BLOOD PRESSURE < 130 MM HG: ICD-10-PCS | Mod: CPTII,,, | Performed by: NURSE PRACTITIONER

## 2023-08-24 PROCEDURE — 99213 PR OFFICE/OUTPT VISIT, EST, LEVL III, 20-29 MIN: ICD-10-PCS | Mod: ,,, | Performed by: NURSE PRACTITIONER

## 2023-08-24 PROCEDURE — 4010F PR ACE/ARB THEARPY RXD/TAKEN: ICD-10-PCS | Mod: CPTII,,, | Performed by: NURSE PRACTITIONER

## 2023-08-24 PROCEDURE — 1160F RVW MEDS BY RX/DR IN RCRD: CPT | Mod: CPTII,,, | Performed by: NURSE PRACTITIONER

## 2023-08-24 PROCEDURE — 1159F MED LIST DOCD IN RCRD: CPT | Mod: CPTII,,, | Performed by: NURSE PRACTITIONER

## 2023-08-24 PROCEDURE — 3008F PR BODY MASS INDEX (BMI) DOCUMENTED: ICD-10-PCS | Mod: CPTII,,, | Performed by: NURSE PRACTITIONER

## 2023-08-24 PROCEDURE — 1159F PR MEDICATION LIST DOCUMENTED IN MEDICAL RECORD: ICD-10-PCS | Mod: CPTII,,, | Performed by: NURSE PRACTITIONER

## 2023-08-24 RX ORDER — DAPAGLIFLOZIN 10 MG/1
10 TABLET, FILM COATED ORAL DAILY
Qty: 90 TABLET | Refills: 0 | Status: SHIPPED | OUTPATIENT
Start: 2023-08-24 | End: 2023-11-28 | Stop reason: ALTCHOICE

## 2023-08-24 RX ORDER — HYDROCHLOROTHIAZIDE 12.5 MG/1
12.5 CAPSULE ORAL DAILY
Qty: 90 CAPSULE | Refills: 0 | Status: SHIPPED | OUTPATIENT
Start: 2023-08-24 | End: 2023-11-28 | Stop reason: SDUPTHER

## 2023-08-24 RX ORDER — LISINOPRIL 40 MG/1
40 TABLET ORAL DAILY
Qty: 90 TABLET | Refills: 0 | Status: SHIPPED | OUTPATIENT
Start: 2023-08-24 | End: 2023-11-28 | Stop reason: SDUPTHER

## 2023-08-24 RX ORDER — METFORMIN HYDROCHLORIDE 1000 MG/1
1000 TABLET ORAL 2 TIMES DAILY
Qty: 180 TABLET | Refills: 0 | Status: SHIPPED | OUTPATIENT
Start: 2023-08-24 | End: 2023-11-28 | Stop reason: ALTCHOICE

## 2023-08-24 RX ORDER — ROSUVASTATIN CALCIUM 40 MG/1
40 TABLET, COATED ORAL DAILY
Qty: 90 TABLET | Refills: 0 | Status: SHIPPED | OUTPATIENT
Start: 2023-08-24 | End: 2023-11-28 | Stop reason: SDUPTHER

## 2023-08-24 NOTE — PROGRESS NOTES
Patient ID: [id number]     Chief Complaint: Follow-up (3 month F/U with Labs)      HPI:     Bladimir Real is a 47 y.o. male in the office following up on diabetes.  Labs completed prior to appointment and discuss with him today.  Since following his diabetic diet, tolerating new diabetes medication very well.  He has no complaints.  He is due for a diabetic eye exam.  He has not seen his cardiologist annually but plans to do so soon.  Is not exercising.  He denies any low blood sugar readings.      Past Medical History:  has a past medical history of Diabetes mellitus, type 2, Hyperlipidemia, Hypertension, and Morbid obesity.    Social History:  reports that he has never smoked. His smokeless tobacco use includes chew. He reports that he does not drink alcohol and does not use drugs.    Current Outpatient Medications   Medication Instructions    albuterol (PROAIR HFA) 90 mcg/actuation inhaler 2 puffs, Inhalation, Every 6 hours PRN, Rescue    amitriptyline (ELAVIL) 25 mg, Oral, Nightly    blood sugar diagnostic Strp 1 strip, Misc.(Non-Drug; Combo Route), 2 times daily    blood-glucose meter (BLOOD GLUCOSE MONITOR KIT MISC) 1 Units, Misc.(Non-Drug; Combo Route)    FARXIGA 10 mg, Oral, Daily    hydroCHLOROthiazide (MICROZIDE) 12.5 mg, Oral, Daily    lancets 33 gauge Misc 1 lancet , Misc.(Non-Drug; Combo Route)    lisinopriL (PRINIVIL,ZESTRIL) 40 mg, Oral, Daily    metFORMIN (GLUCOPHAGE) 1,000 mg, Oral, 2 times daily    rosuvastatin (CRESTOR) 40 mg, Oral, Daily       Patient is allergic to penicillins.     Patient Care Team:  Loida Butler FNP-C as PCP - General (Family Medicine)  Grupo Moura MD as Consulting Physician (Cardiology)  Ritchie Bar MD as Consulting Physician (Cardiology)       Subjective     Review of Systems    See HPI     Objective     Visit Vitals  /82 (BP Location: Right arm, Patient Position: Sitting, BP Method: Large (Manual))   Pulse 72   Temp 97.9 °F (36.6 °C) (Oral)   Ht 5'  "10.08" (1.78 m)   Wt 125.4 kg (276 lb 7.3 oz)   SpO2 99%   BMI 39.58 kg/m²       Physical Exam  Vitals reviewed.   Constitutional:       Appearance: Normal appearance. He is obese.   Cardiovascular:      Rate and Rhythm: Normal rate and regular rhythm.      Heart sounds: Normal heart sounds.   Pulmonary:      Effort: Pulmonary effort is normal.      Breath sounds: Normal breath sounds.   Musculoskeletal:      Cervical back: No tenderness.      Right lower leg: No edema.      Left lower leg: No edema.   Lymphadenopathy:      Cervical: No cervical adenopathy.   Skin:     General: Skin is warm and dry.   Neurological:      Mental Status: He is alert and oriented to person, place, and time.   Psychiatric:         Mood and Affect: Mood normal.         Behavior: Behavior normal.         Thought Content: Thought content normal.       Assessment:       ICD-10-CM ICD-9-CM   1. Type 2 diabetes mellitus without complication, without long-term current use of insulin  E11.9 250.00   2. Primary hypertension  I10 401.9   3. Mixed hyperlipidemia  E78.2 272.2   4. Morbid obesity  E66.01 278.01   5. Chronic venous insufficiency  I87.2 459.81   6. Coronary artery disease, unspecified vessel or lesion type, unspecified whether angina present, unspecified whether native or transplanted heart  I25.10 414.00   7. Screening for malignant neoplasm of colon  Z12.11 V76.51        Plan:     Encouraged him to scheduled DM eye exam  Encouraged annual appointment with cardiology  Continue all medications without change  Cologaurd ordered.      Follow up in about 3 months (around 11/24/2023) for Routine, fasting labs prior. In addition to their next scheduled appointment, the patient has also been instructed to follow up on as needed basis.     Future Appointments   Date Time Provider Department Center   2/22/2024  8:10 AM LAB, [facility] LABORATORY DRAW STATION JAYLA TAMAR [facility]   2/29/2024  9:30 AM Loida Butler, [facility] LARA [facility] "